# Patient Record
Sex: FEMALE
[De-identification: names, ages, dates, MRNs, and addresses within clinical notes are randomized per-mention and may not be internally consistent; named-entity substitution may affect disease eponyms.]

---

## 2024-10-17 ENCOUNTER — DASHBOARD ENCOUNTERS (OUTPATIENT)
Age: 69
End: 2024-10-17

## 2024-10-17 ENCOUNTER — OFFICE VISIT (OUTPATIENT)
Dept: URBAN - METROPOLITAN AREA CLINIC 115 | Facility: CLINIC | Age: 69
End: 2024-10-17
Payer: MEDICARE

## 2024-10-17 VITALS
SYSTOLIC BLOOD PRESSURE: 130 MMHG | DIASTOLIC BLOOD PRESSURE: 77 MMHG | BODY MASS INDEX: 29.25 KG/M2 | TEMPERATURE: 97.1 F | WEIGHT: 182 LBS | HEIGHT: 66 IN | HEART RATE: 74 BPM

## 2024-10-17 DIAGNOSIS — K21.9 GASTROESOPHAGEAL REFLUX DISEASE, UNSPECIFIED WHETHER ESOPHAGITIS PRESENT: ICD-10-CM

## 2024-10-17 PROBLEM — 235595009: Status: ACTIVE | Noted: 2024-10-17

## 2024-10-17 PROCEDURE — 99203 OFFICE O/P NEW LOW 30 MIN: CPT

## 2024-10-17 RX ORDER — PANTOPRAZOLE SODIUM 40 MG/1
1 TABLET TABLET, DELAYED RELEASE ORAL ONCE A DAY
Status: ACTIVE | COMMUNITY

## 2024-10-17 RX ORDER — AMLODIPINE BESYLATE 5 MG/1
1 TABLET TABLET ORAL ONCE A DAY
Status: ACTIVE | COMMUNITY

## 2024-10-17 RX ORDER — PANTOPRAZOLE SODIUM 20 MG/1
1 TABLET BEFORE BREAKFAST TABLET, DELAYED RELEASE ORAL ONCE A DAY
Qty: 30 TABLET | Refills: 1 | OUTPATIENT
Start: 2024-10-17

## 2024-10-17 NOTE — HPI-TODAY'S VISIT:
70 y/o F with PMH of HLD, HTN presents with c/o intermittent reflux for the past couple mos. Tried Prilosec which did not help; pantoprazole 40mg qd for the past month has been helping. Sx usually include throat burning, cough. Had similar sx when she was diagnosed with COVID back in 1/2024, but was without any URI sx. BMs are daily, complete evacuation. Denies abdominal pain, n/v, diarrhea, constipation, blood in stool, dysphagia, odynophagia, unintentional weight loss, change in appetite, early satiety. Denies NSAID use. Denies  EtOH/tobacco/marijuana use.  Last colonoscopy: 2011 showed hemorrhoids only; repeat in 10Y; states she had another 2018 that was normal; repeat in 2028; had neg FOBT this year.

## 2024-12-19 ENCOUNTER — OFFICE VISIT (OUTPATIENT)
Dept: URBAN - METROPOLITAN AREA CLINIC 115 | Facility: CLINIC | Age: 69
End: 2024-12-19

## 2025-01-21 ENCOUNTER — OFFICE VISIT (OUTPATIENT)
Dept: URBAN - METROPOLITAN AREA CLINIC 115 | Facility: CLINIC | Age: 70
End: 2025-01-21

## 2025-01-28 ENCOUNTER — OFFICE VISIT (OUTPATIENT)
Dept: URBAN - METROPOLITAN AREA CLINIC 115 | Facility: CLINIC | Age: 70
End: 2025-01-28